# Patient Record
Sex: MALE | Race: WHITE | NOT HISPANIC OR LATINO | Employment: OTHER | URBAN - METROPOLITAN AREA
[De-identification: names, ages, dates, MRNs, and addresses within clinical notes are randomized per-mention and may not be internally consistent; named-entity substitution may affect disease eponyms.]

---

## 2017-02-18 RX ORDER — ATORVASTATIN CALCIUM 20 MG/1
TABLET, FILM COATED ORAL
Qty: 90 TABLET | Refills: 1 | Status: SHIPPED | OUTPATIENT
Start: 2017-02-18 | End: 2017-08-20 | Stop reason: SDUPTHER

## 2017-08-21 RX ORDER — ATORVASTATIN CALCIUM 20 MG/1
20 TABLET, FILM COATED ORAL NIGHTLY
Qty: 90 TABLET | Refills: 0 | Status: SHIPPED | OUTPATIENT
Start: 2017-08-21 | End: 2017-11-20 | Stop reason: SDUPTHER

## 2017-11-01 ENCOUNTER — OFFICE VISIT (OUTPATIENT)
Dept: CARDIOLOGY | Facility: CLINIC | Age: 72
End: 2017-11-01

## 2017-11-01 VITALS
HEIGHT: 70 IN | SYSTOLIC BLOOD PRESSURE: 142 MMHG | BODY MASS INDEX: 29.78 KG/M2 | HEART RATE: 77 BPM | WEIGHT: 208 LBS | DIASTOLIC BLOOD PRESSURE: 82 MMHG

## 2017-11-01 DIAGNOSIS — E78.2 MIXED HYPERLIPIDEMIA: ICD-10-CM

## 2017-11-01 DIAGNOSIS — I25.10 CORONARY ARTERY DISEASE INVOLVING NATIVE CORONARY ARTERY OF NATIVE HEART WITHOUT ANGINA PECTORIS: Primary | ICD-10-CM

## 2017-11-01 PROCEDURE — 99214 OFFICE O/P EST MOD 30 MIN: CPT | Performed by: INTERNAL MEDICINE

## 2017-11-01 PROCEDURE — 93000 ELECTROCARDIOGRAM COMPLETE: CPT | Performed by: INTERNAL MEDICINE

## 2017-11-01 NOTE — PROGRESS NOTES
Date of Office Visit: 2017  Encounter Provider: Adin Olivas MD  Place of Service: [facility] CARDIOLOGY  Patient Name: Ramon Reid  :1945      Chief Complaint   Patient presents with   • Coronary Artery Disease     History of Present Illness    Patient is a 72-year-old white male with coronary artery disease status post CABG many years ago.  He remains again completely asymptomatic.  He denies any complaints of chest pain shortness of breath, lightheadedness dizziness nor orthopnea.  From my previous notes in him and continuing today he also complains of erectile dysfunction.  He now is complaining of symptoms that sound typical of BPH.    Past Medical History:   Diagnosis Date   • Hyperlipidemia    • Ischemic cardiomyopathy    • Myocardial ischemia          Past Surgical History:   Procedure Laterality Date   • CORONARY ARTERY BYPASS GRAFT             Current Outpatient Prescriptions:   •  aspirin 81 MG tablet, Take  by mouth daily., Disp: , Rfl:   •  atorvastatin (LIPITOR) 20 MG tablet, Take 1 tablet by mouth Every Night. NEEDS CHOLESTEROL LABS, Disp: 90 tablet, Rfl: 0  •  nitroglycerin (NITROSTAT) 0.4 MG SL tablet, Place 1 tablet under the tongue every 5 (five) minutes as needed for chest pain., Disp: 25 tablet, Rfl: 3      Social History     Social History   • Marital status:      Spouse name: N/A   • Number of children: N/A   • Years of education: N/A     Occupational History   • Not on file.     Social History Main Topics   • Smoking status: Former Smoker   • Smokeless tobacco: Not on file   • Alcohol use Not on file   • Drug use: Not on file   • Sexual activity: Not on file     Other Topics Concern   • Not on file     Social History Narrative         Review of Systems   Constitution: Negative.   HENT: Negative.    Eyes: Negative.    Cardiovascular: Negative.    Respiratory: Negative.    Endocrine: Negative.    Skin: Negative.   "  Musculoskeletal: Negative.    Gastrointestinal: Negative.    Genitourinary: Positive for decreased libido, frequency and hesitancy.   Neurological: Negative.    Psychiatric/Behavioral: Negative.        Procedures      ECG 12 Lead  Date/Time: 11/1/2017 1:04 PM  Performed by: SIMON SEARS  Authorized by: SIMON SEARS   Comparison: compared with previous ECG from 8/4/2016  Similar to previous ECG  Rhythm: sinus rhythm  Rate: normal  Conduction: conduction normal  QRS axis: normal                 Objective:    /82  Pulse 77  Ht 70\" (177.8 cm)  Wt 208 lb (94.3 kg)  BMI 29.84 kg/m2        Physical Exam   Constitutional: He is oriented to person, place, and time. He appears well-developed and well-nourished.   HENT:   Head: Normocephalic.   Eyes: Pupils are equal, round, and reactive to light.   Neck: Normal range of motion. No JVD present. Carotid bruit is not present. No thyromegaly present.   Cardiovascular: Normal rate, regular rhythm, S1 normal, S2 normal, normal heart sounds and intact distal pulses.  Exam reveals no gallop and no friction rub.    No murmur heard.  Pulmonary/Chest: Effort normal and breath sounds normal.   Abdominal: Soft. Bowel sounds are normal.   Musculoskeletal: He exhibits no edema.   Neurological: He is alert and oriented to person, place, and time.   Skin: Skin is warm, dry and intact. No erythema.   Psychiatric: He has a normal mood and affect.   Vitals reviewed.          Assessment:       Diagnosis Plan   1. Coronary artery disease involving native coronary artery of native heart without angina pectoris     2. Mixed hyperlipidemia         Coronary Artery Disease  Assessment  • The patient has no angina    Plan  • Lifestyle modifications discussed include adhering to a heart healthy diet, regular exercise and regular monitoring of cholesterol and blood pressure    Subjective - Objective  • There is a history of previous coronary artery bypass graft  • Current " antiplatelet therapy includes aspirin 81 mg       Hyperlipidemia: I reviewed his laboratory work all appears to be good.  He remains on medical therapy  Benign prostatic hypertrophy, suspected: I referred him again to Dr. Fredis Orr.

## 2017-11-21 RX ORDER — ATORVASTATIN CALCIUM 20 MG/1
20 TABLET, FILM COATED ORAL NIGHTLY
Qty: 90 TABLET | Refills: 3 | Status: SHIPPED | OUTPATIENT
Start: 2017-11-21 | End: 2018-11-01 | Stop reason: SDUPTHER

## 2018-09-24 PROBLEM — I25.5 CARDIOMYOPATHY, ISCHEMIC: Status: ACTIVE | Noted: 2018-09-24

## 2018-09-24 PROBLEM — E78.5 HLD (HYPERLIPIDEMIA): Status: ACTIVE | Noted: 2018-09-24

## 2018-09-24 NOTE — PROGRESS NOTES
Date of Office Visit: 2018  Encounter Provider: PEEWEE Mckenzie  Place of Service: [facility] CARDIOLOGY  Patient Name: Ramon Reid  :1945      Subjective:     Chief Complaint: CAD      History of Present Illness:  Ramon Reid is a pleasant 73 y.o. male who is new to me.  Outside records have been obtained and reviewed by me.  The patient has a past medical history of coronary artery disease status post coronary artery bypass grafting ×6 in , ischemic cardiomyopathy, hyperlipidemia.    The patient presented to [facility] on 18 after having a syncopal episode.  The patient was sitting outside playing a game with his wife when he felt very hot and had sudden episode of syncope.  His wife yelled his name and he gradually came back to his normal mental status.  His total loss of consciousness lasted for approximately 2-3 seconds.  After the patient came to he originally did not believe his wife that he had fainted.  There was no reported seizure activity noted.  Aside from dizziness, the patient reported no symptoms before or after the episode.  He denied any chest pain, shortness of breath, diaphoresis, palpitations, nausea, vomiting, diarrhea, fever, chills, cough.  He does report constant numbness and tingling of the left hand which is chronic and he was told in the past that it was likely the related to how he is slept in the past and has been previously worked up.    His EKG revealed normal sinus rhythm, normal axis, no acute ischemic changes, QTC of 429 ms.His chest x-ray PA and lateral  on 18 showed lungs are clear, no pneumothorax or pleural effusion and no acute cardiopulmonary findings.  His heart and mediastinal contours were normal and there were median sternotomy wires present.  His CT of the head on 18 showed no acute intracranial abnormality, mild to moderate age-related atrophy and chronic small vessel disease,  mucosal thickening in bilateral ethmoid air cells and bilateral maxillary sinuses.  His labs were essentially unremarkable aside from hypomagnesemia.  Cardiac enzymes ×3 were negative.  D-dimer was negative at 0.45.  Urine negative.  Toxicology was negative.  Carotid ultrasounds showed no significant stenosis but mild plaque noted in both carotid systems.  His vital signs were stable blood pressures ranging 130s to 140s over 70s and 80s.  Heart rate ranging 60s to 70s.  He was afebrile and pulse ox was % on room air.  Orthostatics were also ordered.    There were no arrhythmias on rhythm strips from the heart monitor.  Echocardiogram was obtained and pending at the time of his discharge.  [facility] cardiology Dr. Shahbaz Mayen with consulted to evaluate the patient and the patient was supposed to undergo cardiac catheterization because he had not had an ischemic workup (although previously recommended multiple times) since his bypass surgery in 2000.  However there was miscommunication and the wife brought the patient breakfast although it was supposed to have nothing by mouth.  It was felt that since the patient no longer had symptoms and was unable to have the cardiac catheterization because he ate that he was stable for discharge and he could follow up with his primary cardiologist.  He was discharged home on supplemental dose of magnesium, as well as on aspirin 81 mg, atorvastatin 10 mg and magnesium oxide 400 mg daily.  I received the echocardiogram results from [facility] from 9/26/18.  It was reported that it was a technically difficult study with limited windows, visualized valves appear structurally normal with mild mitral and tricuspid insufficiency, borderline left atrial enlargement, the left ventricle size was upper limits of normal with mild global hypokinesis and an estimated ejection fraction of 45-50%, there was no pericardial effusion seen.  Calculated ejection fraction was  "47%.  It appears his last echocardiogram was on 11/30/1999 in which revealed normal chamber sizes, normal LV systolic and diastolic function, mild mitral valve thickening and trace tricuspid insufficiency with an estimated RVSP of 26 mmHg which is normal.  Do not see a reported ejection fraction on this report.  It states the LV stroke volume is 44 cc and the calculated effective cardiac output is 3.83 L/m.  His last cardiac catheterization on 1/7/2000 prior to his coronary artery bypass grafting demonstrated two-vessel coronary artery disease (left circumflex and RCA )as well as a mildly reduced left ventricular function with an EF of 50% with inferior hypokinesia.    The patient has refused multiple Cardiolite stress tests in the past for several years now after having a \"bad experience\" with his last Cardiolite stress test, which and the documentation sounds like it may have been tachycardia.The patient was last in the office in 11/1/17 by Dr. Olivas.    He is here today for hospital follow-up. He has had no recurrence of symptoms.  His wife stated that they may have exaggerated some symptoms while he was at [facility] because they wanted to get some things checked out before he retires.  He really felt fine immediately after his syncopal episode and only had a little bit of dizziness prior to the syncope. It was hot outside that day and he was sweating while sitting on the porch.  He is now on a magnesium supplement for his hypomagnesemia.  He has a follow-up with his PCP Dr. Oneill on Monday to follow up on lab work.  He is requesting a refill on sublingual nitroglycerin, though he has never used it or needed in the past he was told by Dr. Olivas in the past that it was good to always keep on hand with his heart disease.  He denies any chest pain, shortness breath, palpitations, swelling of the legs, dizziness, syncope, near syncope, falls, fatigue, PND, orthopnea or significant weight gain.  He " reports that he can cut grass and repeat 5 acres without experiencing any angina.  Daunting he noticed since his CABG in 2000 was that he gets episodes of sweating and hot flashes these have been occurring intermittently since his surgery but he said it may be has been happening a little more frequently in the last year.  There is no other associated symptoms with these hot flashes and sweating.  He reports he checks his blood pressure at home couple times a week and is running 129-133/70's.  It is a little bit elevated in office today at 158/76.  He reports his heart rate runs in the 60s to 70s.  He recalls being on some type of blood pressure medicine several years ago in the past after his heart surgery and thinks he was taken off of it for having an episode of hypotension with dizziness and sensation of fainting.      Past Medical History:   Diagnosis Date   • CAD (coronary artery disease)    • Episode of syncope 09/20/2018    [place], [place]   • Hyperlipidemia    • Hypomagnesemia 09/20/2018   • Ischemic cardiomyopathy    • Myocardial ischemia      Past Surgical History:   Procedure Laterality Date   • CORONARY ARTERY BYPASS GRAFT  2000     Outpatient Medications Prior to Visit   Medication Sig Dispense Refill   • aspirin 81 MG tablet Take  by mouth daily.     • atorvastatin (LIPITOR) 20 MG tablet Take 1 tablet by mouth Every Night. 90 tablet 3   • nitroglycerin (NITROSTAT) 0.4 MG SL tablet Place 1 tablet under the tongue every 5 (five) minutes as needed for chest pain. 25 tablet 3     No facility-administered medications prior to visit.        Allergies as of 09/28/2018 - Reviewed 09/28/2018   Allergen Reaction Noted   • Thiopental  08/02/2016     Social History     Social History   • Marital status:      Spouse name: N/A   • Number of children: N/A   • Years of education: N/A     Occupational History   • Not on file.     Social History Main Topics   • Smoking status: Former  "Smoker   • Smokeless tobacco: Not on file   • Alcohol use No   • Drug use: No   • Sexual activity: Defer     Other Topics Concern   • Not on file     Social History Narrative   • No narrative on file     History reviewed. No pertinent family history.  Review of Systems   Constitution: Negative for chills, fever and malaise/fatigue.   HENT: Positive for hearing loss (some per patient). Negative for ear pain, nosebleeds and sore throat.    Eyes: Negative for double vision, pain, vision loss in left eye and vision loss in right eye.   Cardiovascular: Positive for syncope (one episode- has not occurred since discharge from hospital). Negative for chest pain, claudication, dyspnea on exertion, irregular heartbeat, leg swelling, near-syncope, orthopnea, palpitations and paroxysmal nocturnal dyspnea.   Respiratory: Positive for snoring. Negative for cough, shortness of breath and wheezing.    Endocrine: Positive for cold intolerance and heat intolerance.   Hematologic/Lymphatic: Negative for bleeding problem.   Skin: Negative for color change, itching, rash and unusual hair distribution.   Musculoskeletal: Positive for joint pain. Negative for joint swelling.   Gastrointestinal: Negative for abdominal pain, diarrhea, hematochezia, melena, nausea and vomiting.   Genitourinary: Negative for decreased libido, frequency, hematuria, hesitancy and incomplete emptying.        Slow urinary stream   Neurological: Positive for paresthesias (left arm). Negative for excessive daytime sleepiness, dizziness, headaches, light-headedness, loss of balance, numbness and seizures.   Psychiatric/Behavioral: Negative for depression.          Objective:     Vitals:    09/28/18 1355   BP: 158/76   BP Location: Left arm   Patient Position: Sitting   Pulse: 73   SpO2: 97%   Weight: 92.5 kg (204 lb)   Height: 177.8 cm (70\")     Body mass index is 29.27 kg/m².    PHYSICAL EXAM:  Physical Exam   Constitutional: He is oriented to person, place, and " time. He appears well-developed and well-nourished. No distress.   HENT:   Head: Normocephalic and atraumatic.   Eyes: Pupils are equal, round, and reactive to light.   Neck: Neck supple. No JVD present. Carotid bruit is not present.   Cardiovascular: Normal rate, regular rhythm, normal heart sounds and intact distal pulses.    No murmur heard.  Pulses:       Radial pulses are 2+ on the right side, and 2+ on the left side.        Posterior tibial pulses are 2+ on the right side, and 2+ on the left side.   Pulmonary/Chest: Effort normal and breath sounds normal. No accessory muscle usage. No respiratory distress. He has no decreased breath sounds. He has no wheezes. He has no rhonchi. He has no rales.   Abdominal: Soft. Bowel sounds are normal. He exhibits no distension. There is no splenomegaly or hepatomegaly. There is no tenderness.   Overweight, abdomen, rounded but compressible   Musculoskeletal: Normal range of motion. He exhibits no edema.   Neurological: He is alert and oriented to person, place, and time.   Skin: Skin is warm, dry and intact. He is not diaphoretic. No erythema.   Psychiatric: He has a normal mood and affect. His speech is normal and behavior is normal. Judgment and thought content normal. Cognition and memory are normal.   Nursing note and vitals reviewed.        ECG 12 Lead  Date/Time: 9/28/2018 2:16 PM  Performed by: IRENA SINHA  Authorized by: IRENA SINHA   Comparison: compared with previous ECG   Similar to previous ECG  Rhythm: sinus rhythm  Ectopy: multifocal PVCs  BPM: 69  Comments: SR with multifocal PVCs  Otherwise normal ECG  Indication: CAD            Assessment:       Diagnosis Plan   1. Coronary artery disease involving native coronary artery of native heart without angina pectoris  Adult Stress Echo W/ Cont or Stress Agent if Necessary Per Protocol   2. S/P CABG (coronary artery bypass graft)  Adult Stress Echo W/ Cont or Stress Agent if Necessary Per Protocol   3.  Cardiomyopathy, ischemic  Adult Stress Echo W/ Cont or Stress Agent if Necessary Per Protocol   4. Hyperlipidemia, unspecified hyperlipidemia type  Adult Stress Echo W/ Cont or Stress Agent if Necessary Per Protocol       Plan:     1.  Coronary Artery Disease  Assessment  • The patient has no angina  • S/p CABG in 2000    Subjective - Objective  • There is a history of previous coronary artery bypass graft  • Current antiplatelet therapy includes aspirin 81 mg      This patient has not had any ischemic workup or stress test since his CABG in 2000 because he refused.  He apparently had a bad reaction and tachycardia with a Cardiolite stress test in the past.  I will order a stress echo.  I sent him a prescription for sublingual nitroglycerin and I explained to him how to use it and what to do an emergency.  Both he and his wife demonstrated understanding.    2. Heart Failure  Assessment  • NYHA class I - There is no limitation of physical activity. Physical activity does not cause fatigue, palpitations or shortness of breath.  • The most recent ejection fraction is 47%  • Left ventricular function is mildly reduced by qualitative assessment  • The left ventricle was last assessed on 9/26/2018  • The patient's echo was done at an outlying facility I am repeating with a stress echo.    Subjective/Objective    • Physical exam findings negative for rales, peripheral edema, elevated JVP and hepatomegaly.        Cardiomyopathy:  Echo results from [facility] from 9/26/18 reported that it was a technically difficult study with limited windows, visualized valves appear structurally normal with mild mitral and tricuspid insufficiency, borderline left atrial enlargement, the left ventricle size was upper limits of normal with mild global hypokinesis and an estimated ejection fraction of 45-50%, there was no pericardial effusion seen.  Calculated ejection fraction was 47%.  The only echo I had to compare to  was  from 1999.  His LV gram on his cardiac cath in 2000 did EF of 50%.  We discussed adding a beta blocker but I will hold off at this time and see what the stress echo shows.  He is showing no signs of heart failure at this time.    3. Hyperlipidemia: On Lipitor 20 mg daily. Apparently he just had some fasting labs that were faxed to our office but I have not received these yet.  He normally has Dr. Olivas check his fasting lipid panel and liver function tests.  I will check for these labs and he will call back next week to confirm we have the lab results we need. If we do not I gave him an order just in case to go have his fasting lipid panel and CMP checked for Dr. Olivas to review at his next visit to make titrations to his Lipitor if needed.      Follow up with Dr. Olivas  on 11/1/18, unless otherwise needed sooner.  I advised the patient to contact our office with any questions or concerns.         Your medication list          Accurate as of 9/28/18  4:52 PM. If you have any questions, ask your nurse or doctor.               CHANGE how you take these medications      Instructions Last Dose Given Next Dose Due   nitroglycerin 0.4 MG SL tablet  Commonly known as:  NITROSTAT  What changed:  additional instructions  Changed by:  PEEWEE Mckenzie      Place 1 tablet under the tongue Every 5 (Five) Minutes As Needed for Chest Pain. If no relief in pain after 2nd dose call 911.          CONTINUE taking these medications      Instructions Last Dose Given Next Dose Due   aspirin 81 MG tablet      Take  by mouth daily.       atorvastatin 20 MG tablet  Commonly known as:  LIPITOR      Take 1 tablet by mouth Every Night.       magnesium oxide 400 (241.3 Mg) MG tablet tablet  Commonly known as:  MAGOX      Take 400 mg by mouth Daily.             Where to Get Your Medications      These medications were sent to [facility] [address] [place] [address] [phone] [facility] [phone] FX   [address] [address]    Phone:  [phone]   · nitroglycerin 0.4 MG SL tablet         The above medication changes may not have been made by this provider.  Medication list was updated to reflect medications patient is currently taking including medication changes and discontinuations made by other healthcare providers.       I have reviewed this case with Nydia Wadsworth PA-C and I will review this case with Dr. Olivas. It has been a pleasure to participate in this patient's care. Please feel free to contact me with any questions or concerns.     Stacey Hull, APRN  09/28/2018       Dictated utilizing Dragon Dictation System.

## 2018-09-28 ENCOUNTER — OFFICE VISIT (OUTPATIENT)
Dept: CARDIOLOGY | Facility: CLINIC | Age: 73
End: 2018-09-28

## 2018-09-28 VITALS
SYSTOLIC BLOOD PRESSURE: 158 MMHG | BODY MASS INDEX: 29.2 KG/M2 | DIASTOLIC BLOOD PRESSURE: 76 MMHG | OXYGEN SATURATION: 97 % | HEART RATE: 73 BPM | HEIGHT: 70 IN | WEIGHT: 204 LBS

## 2018-09-28 DIAGNOSIS — I25.5 CARDIOMYOPATHY, ISCHEMIC: ICD-10-CM

## 2018-09-28 DIAGNOSIS — I25.10 CORONARY ARTERY DISEASE INVOLVING NATIVE CORONARY ARTERY OF NATIVE HEART WITHOUT ANGINA PECTORIS: Primary | ICD-10-CM

## 2018-09-28 DIAGNOSIS — Z95.1 S/P CABG (CORONARY ARTERY BYPASS GRAFT): ICD-10-CM

## 2018-09-28 DIAGNOSIS — E78.5 HYPERLIPIDEMIA, UNSPECIFIED HYPERLIPIDEMIA TYPE: ICD-10-CM

## 2018-09-28 PROCEDURE — 99214 OFFICE O/P EST MOD 30 MIN: CPT | Performed by: NURSE PRACTITIONER

## 2018-09-28 PROCEDURE — 93000 ELECTROCARDIOGRAM COMPLETE: CPT | Performed by: NURSE PRACTITIONER

## 2018-09-28 RX ORDER — MAGNESIUM CARB/ALUMINUM HYDROX 105-160MG
150 TABLET,CHEWABLE ORAL ONCE
COMMUNITY
End: 2018-09-28

## 2018-09-28 RX ORDER — NITROGLYCERIN 0.4 MG/1
0.4 TABLET SUBLINGUAL
Qty: 45 TABLET | Refills: 11 | Status: SHIPPED | OUTPATIENT
Start: 2018-09-28 | End: 2020-10-07 | Stop reason: SDUPTHER

## 2018-10-10 ENCOUNTER — HOSPITAL ENCOUNTER (OUTPATIENT)
Dept: CARDIOLOGY | Facility: HOSPITAL | Age: 73
Discharge: HOME | End: 2018-10-10
Admitting: NURSE PRACTITIONER

## 2018-10-10 VITALS
WEIGHT: 206 LBS | BODY MASS INDEX: 29.49 KG/M2 | DIASTOLIC BLOOD PRESSURE: 78 MMHG | SYSTOLIC BLOOD PRESSURE: 128 MMHG | HEART RATE: 71 BPM | HEIGHT: 70 IN | OXYGEN SATURATION: 97 %

## 2018-10-10 DIAGNOSIS — Z95.1 S/P CABG (CORONARY ARTERY BYPASS GRAFT): ICD-10-CM

## 2018-10-10 DIAGNOSIS — I25.10 CORONARY ARTERY DISEASE INVOLVING NATIVE CORONARY ARTERY OF NATIVE HEART WITHOUT ANGINA PECTORIS: ICD-10-CM

## 2018-10-10 DIAGNOSIS — E78.5 HYPERLIPIDEMIA, UNSPECIFIED HYPERLIPIDEMIA TYPE: ICD-10-CM

## 2018-10-10 DIAGNOSIS — I25.5 CARDIOMYOPATHY, ISCHEMIC: ICD-10-CM

## 2018-10-10 LAB
BH CV ECHO MEAS - ACS: 2.2 CM
BH CV ECHO MEAS - AO MAX PG: 3.7 MMHG
BH CV ECHO MEAS - AO ROOT AREA (BSA CORRECTED): 1.7
BH CV ECHO MEAS - AO ROOT AREA: 10.2 CM^2
BH CV ECHO MEAS - AO ROOT DIAM: 3.6 CM
BH CV ECHO MEAS - AO V2 MAX: 96 CM/SEC
BH CV ECHO MEAS - BSA(HAYCOCK): 2.2 M^2
BH CV ECHO MEAS - BSA: 2.1 M^2
BH CV ECHO MEAS - BZI_BMI: 29.6 KILOGRAMS/M^2
BH CV ECHO MEAS - BZI_METRIC_HEIGHT: 177.8 CM
BH CV ECHO MEAS - BZI_METRIC_WEIGHT: 93.4 KG
BH CV ECHO MEAS - EDV(CUBED): 192.8 ML
BH CV ECHO MEAS - EDV(MOD-SP4): 85 ML
BH CV ECHO MEAS - EDV(TEICH): 165 ML
BH CV ECHO MEAS - EF(CUBED): 68 %
BH CV ECHO MEAS - EF(MOD-BP): 50 %
BH CV ECHO MEAS - EF(MOD-SP4): 49.4 %
BH CV ECHO MEAS - EF(TEICH): 58.8 %
BH CV ECHO MEAS - ESV(CUBED): 61.7 ML
BH CV ECHO MEAS - ESV(MOD-SP4): 43 ML
BH CV ECHO MEAS - ESV(TEICH): 68 ML
BH CV ECHO MEAS - FS: 31.6 %
BH CV ECHO MEAS - IVS/LVPW: 0.91
BH CV ECHO MEAS - IVSD: 0.87 CM
BH CV ECHO MEAS - LAT PEAK E' VEL: 8 CM/SEC
BH CV ECHO MEAS - LV DIASTOLIC VOL/BSA (35-75): 40.2 ML/M^2
BH CV ECHO MEAS - LV MASS(C)D: 205.6 GRAMS
BH CV ECHO MEAS - LV MASS(C)DI: 97.3 GRAMS/M^2
BH CV ECHO MEAS - LV SYSTOLIC VOL/BSA (12-30): 20.3 ML/M^2
BH CV ECHO MEAS - LVIDD: 5.8 CM
BH CV ECHO MEAS - LVIDS: 4 CM
BH CV ECHO MEAS - LVLD AP4: 7 CM
BH CV ECHO MEAS - LVLS AP4: 6.7 CM
BH CV ECHO MEAS - LVPWD: 0.96 CM
BH CV ECHO MEAS - MED PEAK E' VEL: 6 CM/SEC
BH CV ECHO MEAS - MV A DUR: 0.13 SEC
BH CV ECHO MEAS - MV A MAX VEL: 91.7 CM/SEC
BH CV ECHO MEAS - MV DEC SLOPE: 255.8 CM/SEC^2
BH CV ECHO MEAS - MV DEC TIME: 0.24 SEC
BH CV ECHO MEAS - MV E MAX VEL: 62.6 CM/SEC
BH CV ECHO MEAS - MV E/A: 0.68
BH CV ECHO MEAS - MV MAX PG: 3 MMHG
BH CV ECHO MEAS - MV MEAN PG: 1.2 MMHG
BH CV ECHO MEAS - MV P1/2T MAX VEL: 64 CM/SEC
BH CV ECHO MEAS - MV P1/2T: 73.3 MSEC
BH CV ECHO MEAS - MV V2 MAX: 86.3 CM/SEC
BH CV ECHO MEAS - MV V2 MEAN: 51.7 CM/SEC
BH CV ECHO MEAS - MV V2 VTI: 23.5 CM
BH CV ECHO MEAS - MVA P1/2T LCG: 3.4 CM^2
BH CV ECHO MEAS - MVA(P1/2T): 3 CM^2
BH CV ECHO MEAS - PULM A REVS DUR: 0.12 SEC
BH CV ECHO MEAS - PULM A REVS VEL: 32.8 CM/SEC
BH CV ECHO MEAS - PULM DIAS VEL: 46.1 CM/SEC
BH CV ECHO MEAS - PULM S/D: 1.3
BH CV ECHO MEAS - PULM SYS VEL: 61.2 CM/SEC
BH CV ECHO MEAS - RAP SYSTOLE: 3 MMHG
BH CV ECHO MEAS - RVSP: 14 MMHG
BH CV ECHO MEAS - SI(CUBED): 62 ML/M^2
BH CV ECHO MEAS - SI(MOD-SP4): 19.9 ML/M^2
BH CV ECHO MEAS - SI(TEICH): 45.9 ML/M^2
BH CV ECHO MEAS - SUP REN AO DIAM: 1.8 CM
BH CV ECHO MEAS - SV(CUBED): 131 ML
BH CV ECHO MEAS - SV(MOD-SP4): 42 ML
BH CV ECHO MEAS - SV(TEICH): 97 ML
BH CV ECHO MEAS - TR MAX VEL: 153.4 CM/SEC
BH CV ECHO MEASUREMENTS AVERAGE E/E' RATIO: 8.94
BH CV STRESS BP STAGE 1: NORMAL
BH CV STRESS BP STAGE 2: NORMAL
BH CV STRESS DURATION MIN STAGE 1: 3
BH CV STRESS DURATION MIN STAGE 2: 3
BH CV STRESS DURATION SEC STAGE 1: 0
BH CV STRESS DURATION SEC STAGE 2: 0
BH CV STRESS ECHO POST STRESS EJECTION FRACTION EF: 63 %
BH CV STRESS GRADE STAGE 1: 10
BH CV STRESS GRADE STAGE 2: 12
BH CV STRESS HR STAGE 1: 120
BH CV STRESS HR STAGE 2: 148
BH CV STRESS METS STAGE 1: 5
BH CV STRESS METS STAGE 2: 7.5
BH CV STRESS PROTOCOL 1: NORMAL
BH CV STRESS SPEED STAGE 1: 1.7
BH CV STRESS SPEED STAGE 2: 2.5
BH CV STRESS STAGE 1: 1
BH CV STRESS STAGE 2: 2
LEFT ATRIUM VOLUME INDEX: 23 ML/M2
MAXIMAL PREDICTED HEART RATE: 146 BPM
PERCENT MAX PREDICTED HR: 101.37 %
STRESS BASELINE BP: NORMAL MMHG
STRESS BASELINE HR: 71 BPM
STRESS PERCENT HR: 119 %
STRESS POST ESTIMATED WORKLOAD: 7 METS
STRESS POST EXERCISE DUR MIN: 6 MIN
STRESS POST EXERCISE DUR SEC: 0 SEC
STRESS POST PEAK BP: NORMAL MMHG
STRESS POST PEAK HR: 148 BPM
STRESS TARGET HR: 124 BPM

## 2018-10-10 PROCEDURE — 93325 DOPPLER ECHO COLOR FLOW MAPG: CPT | Performed by: INTERNAL MEDICINE

## 2018-10-10 PROCEDURE — 93320 DOPPLER ECHO COMPLETE: CPT | Performed by: INTERNAL MEDICINE

## 2018-10-10 PROCEDURE — 93016 CV STRESS TEST SUPVJ ONLY: CPT | Performed by: INTERNAL MEDICINE

## 2018-10-10 PROCEDURE — 93325 DOPPLER ECHO COLOR FLOW MAPG: CPT

## 2018-10-10 PROCEDURE — 93320 DOPPLER ECHO COMPLETE: CPT

## 2018-10-10 PROCEDURE — 93352 ADMIN ECG CONTRAST AGENT: CPT | Performed by: INTERNAL MEDICINE

## 2018-10-10 PROCEDURE — 93350 STRESS TTE ONLY: CPT

## 2018-10-10 PROCEDURE — 93350 STRESS TTE ONLY: CPT | Performed by: INTERNAL MEDICINE

## 2018-10-10 PROCEDURE — 25010000002 PERFLUTREN (DEFINITY) 8.476 MG IN SODIUM CHLORIDE 0.9 % 10 ML INJECTION: Performed by: INTERNAL MEDICINE

## 2018-10-10 PROCEDURE — 93017 CV STRESS TEST TRACING ONLY: CPT

## 2018-10-10 PROCEDURE — 93018 CV STRESS TEST I&R ONLY: CPT | Performed by: INTERNAL MEDICINE

## 2018-10-10 RX ADMIN — PERFLUTREN 1.5 ML: 6.52 INJECTION, SUSPENSION INTRAVENOUS at 10:13

## 2018-11-01 ENCOUNTER — OFFICE VISIT (OUTPATIENT)
Dept: CARDIOLOGY | Facility: CLINIC | Age: 73
End: 2018-11-01

## 2018-11-01 VITALS
WEIGHT: 206 LBS | DIASTOLIC BLOOD PRESSURE: 78 MMHG | BODY MASS INDEX: 29.49 KG/M2 | HEART RATE: 71 BPM | SYSTOLIC BLOOD PRESSURE: 118 MMHG | HEIGHT: 70 IN

## 2018-11-01 DIAGNOSIS — E83.42 HYPOMAGNESEMIA: ICD-10-CM

## 2018-11-01 DIAGNOSIS — E78.5 HYPERLIPIDEMIA, UNSPECIFIED HYPERLIPIDEMIA TYPE: ICD-10-CM

## 2018-11-01 DIAGNOSIS — I25.10 CORONARY ARTERY DISEASE INVOLVING NATIVE CORONARY ARTERY OF NATIVE HEART WITHOUT ANGINA PECTORIS: Primary | ICD-10-CM

## 2018-11-01 PROCEDURE — 99214 OFFICE O/P EST MOD 30 MIN: CPT | Performed by: INTERNAL MEDICINE

## 2018-11-01 PROCEDURE — 93000 ELECTROCARDIOGRAM COMPLETE: CPT | Performed by: INTERNAL MEDICINE

## 2018-11-01 RX ORDER — ATORVASTATIN CALCIUM 20 MG/1
20 TABLET, FILM COATED ORAL NIGHTLY
Qty: 90 TABLET | Refills: 3 | Status: SHIPPED | OUTPATIENT
Start: 2018-11-01 | End: 2019-12-11 | Stop reason: SDUPTHER

## 2018-11-01 NOTE — PROGRESS NOTES
Date of Office Visit: 2018  Encounter Provider: Adin Olivas MD  Place of Service: [facility] CARDIOLOGY  Patient Name: Ramon Reid  :1945      Chief Complaint   Patient presents with   • Coronary Artery Disease     History of Present Illness    The patient is a 73-year-old white male with a history of coronary artery disease.  He is status post previous bypass surgery a number of years ago.  He was evaluated one month ago here in the office after experiencing a syncopal episode that lasted for just a few seconds.  Lies at [facility] at the end result was hypomagnesemia.  Since that has been replaced the tingling he used to have in his left arm is gone.  Not had any recurrent episodes of syncope.  He is monitored his heart rate and blood pressure over the last month and things have improved greatly.  He had a stress echocardiogram that did not indicate any evidence of significant ischemia or arrhythmic events.    Past Medical History:   Diagnosis Date   • CAD (coronary artery disease)    • Episode of syncope 2018    [place], [place]   • Hyperlipidemia    • Hypomagnesemia 2018   • Ischemic cardiomyopathy    • Myocardial ischemia          Past Surgical History:   Procedure Laterality Date   • CORONARY ARTERY BYPASS GRAFT             Current Outpatient Prescriptions:   •  aspirin 81 MG tablet, Take  by mouth daily., Disp: , Rfl:   •  atorvastatin (LIPITOR) 20 MG tablet, Take 1 tablet by mouth Every Night., Disp: 90 tablet, Rfl: 3  •  magnesium oxide (MAGOX) 400 (241.3 Mg) MG tablet tablet, Take 400 mg by mouth Daily., Disp: , Rfl:   •  nitroglycerin (NITROSTAT) 0.4 MG SL tablet, Place 1 tablet under the tongue Every 5 (Five) Minutes As Needed for Chest Pain. If no relief in pain after 2nd dose call 911., Disp: 45 tablet, Rfl: 11      Social History     Social History   • Marital status:      Spouse  "name: N/A   • Number of children: N/A   • Years of education: N/A     Occupational History   • Not on file.     Social History Main Topics   • Smoking status: Former Smoker   • Smokeless tobacco: Not on file   • Alcohol use No   • Drug use: No   • Sexual activity: Defer     Other Topics Concern   • Not on file     Social History Narrative   • No narrative on file         Review of Systems   Constitution: Negative.   HENT: Negative.    Eyes: Negative.    Cardiovascular: Negative.    Respiratory: Negative.    Endocrine: Negative.    Skin: Negative.    Musculoskeletal: Negative.    Gastrointestinal: Negative.    Neurological: Negative.    Psychiatric/Behavioral: Negative.        Procedures      ECG 12 Lead  Date/Time: 11/1/2018 1:01 PM  Performed by: SIMON SEARS  Authorized by: SIMON SEARS   Comparison: compared with previous ECG from 9/28/2018  Similar to previous ECG  Rhythm: sinus rhythm  Ectopy: unifocal PVCs  Rate: normal  Conduction: conduction normal  QRS axis: normal                  Objective:    /78   Pulse 71   Ht 177.8 cm (70\")   Wt 93.4 kg (206 lb)   BMI 29.56 kg/m²         Physical Exam   Constitutional: He is oriented to person, place, and time. He appears well-developed and well-nourished.   HENT:   Head: Normocephalic.   Eyes: Pupils are equal, round, and reactive to light.   Neck: Normal range of motion. No JVD present. Carotid bruit is not present. No thyromegaly present.   Cardiovascular: Normal rate, regular rhythm, S1 normal, S2 normal, normal heart sounds and intact distal pulses.  Exam reveals no gallop and no friction rub.    No murmur heard.  Pulmonary/Chest: Effort normal and breath sounds normal.   Abdominal: Soft. Bowel sounds are normal.   Musculoskeletal: He exhibits no edema.   Neurological: He is alert and oriented to person, place, and time.   Skin: Skin is warm, dry and intact. No erythema.   Psychiatric: He has a normal mood and affect.   Vitals " reviewed.          Assessment:       Diagnosis Plan   1. Coronary artery disease involving native coronary artery of native heart without angina pectoris     2. Hypomagnesemia     3. Hyperlipidemia, unspecified hyperlipidemia type         1. Coronary Artery Disease  Assessment  • The patient has no angina    Plan  • Lifestyle modifications discussed include adhering to a heart healthy diet, maintenance of a healthy weight, regular exercise and regular monitoring of cholesterol and blood pressure    Subjective - Objective  • There is a history of previous coronary artery bypass graft  • Current antiplatelet therapy includes aspirin 81 mg       2.  Hypomagnesemia: On supplemental magnesium  3.  Hyperlipidemia: On medical therapy  4.  Syncope: Unexplained, possible hypomagnesemia   Plan:     No change in medication at this time.  Patient will follow-up in one year

## 2019-10-02 ENCOUNTER — OFFICE VISIT (OUTPATIENT)
Dept: CARDIOLOGY | Facility: CLINIC | Age: 74
End: 2019-10-02

## 2019-10-02 VITALS
OXYGEN SATURATION: 98 % | DIASTOLIC BLOOD PRESSURE: 82 MMHG | BODY MASS INDEX: 28.6 KG/M2 | WEIGHT: 199.8 LBS | SYSTOLIC BLOOD PRESSURE: 126 MMHG | HEART RATE: 71 BPM | HEIGHT: 70 IN

## 2019-10-02 DIAGNOSIS — E78.5 HYPERLIPIDEMIA, UNSPECIFIED HYPERLIPIDEMIA TYPE: ICD-10-CM

## 2019-10-02 DIAGNOSIS — I25.10 CORONARY ARTERY DISEASE INVOLVING NATIVE CORONARY ARTERY OF NATIVE HEART WITHOUT ANGINA PECTORIS: Primary | ICD-10-CM

## 2019-10-02 PROCEDURE — 93000 ELECTROCARDIOGRAM COMPLETE: CPT | Performed by: INTERNAL MEDICINE

## 2019-10-02 PROCEDURE — 99214 OFFICE O/P EST MOD 30 MIN: CPT | Performed by: INTERNAL MEDICINE

## 2019-10-02 NOTE — PROGRESS NOTES
Date of Office Visit: 10/02/2019    Patient Name: Ramon Reid  : 1945    Encounter Provider: Adin Olivas MD  Referring Provider: No ref. provider found  Place of Service: [facility] CARDIOLOGY  Patient Care Team:  Alayna Oneill MD as PCP - General (Family Medicine)  Adin Olivas MD as PCP - Claims Attributed      Chief Complaint   Patient presents with   • Coronary Artery Disease     1 yr follow up     History of Present Illness  Patient is a 74-year-old white male with a history of coronary artery disease who presents to the office today for follow-up.  He is undergone bypass surgery in .  He denies any lightheadedness, dizziness, orthopnea, paroxysmal nocturnal dyspnea nor angina pectoris.  Past Medical History:   Diagnosis Date   • CAD (coronary artery disease)    • Episode of syncope 2018    [place], [place]   • Hyperlipidemia    • Hypomagnesemia 2018   • Ischemic cardiomyopathy    • Myocardial ischemia          Past Surgical History:   Procedure Laterality Date   • CORONARY ARTERY BYPASS GRAFT             Current Outpatient Medications:   •  aspirin 81 MG tablet, Take  by mouth daily., Disp: , Rfl:   •  atorvastatin (LIPITOR) 20 MG tablet, Take 1 tablet by mouth Every Night., Disp: 90 tablet, Rfl: 3  •  magnesium oxide (MAGOX) 400 (241.3 Mg) MG tablet tablet, Take 400 mg by mouth Daily., Disp: , Rfl:   •  nitroglycerin (NITROSTAT) 0.4 MG SL tablet, Place 1 tablet under the tongue Every 5 (Five) Minutes As Needed for Chest Pain. If no relief in pain after 2nd dose call 911., Disp: 45 tablet, Rfl: 11      Social History     Socioeconomic History   • Marital status:      Spouse name: Not on file   • Number of children: Not on file   • Years of education: Not on file   • Highest education level: Not on file   Tobacco Use   • Smoking status: Former Smoker   • Tobacco comment: caffeine  "use   Substance and Sexual Activity   • Alcohol use: No   • Drug use: No   • Sexual activity: Defer         Review of Systems   Constitution: Negative.   HENT: Negative.    Eyes: Negative.    Cardiovascular: Negative.    Respiratory: Negative.    Endocrine: Negative.    Skin: Negative.    Musculoskeletal: Negative.    Gastrointestinal: Negative.    Neurological: Negative.    Psychiatric/Behavioral: Negative.        Procedures      ECG 12 Lead  Date/Time: 10/2/2019 2:26 PM  Performed by: Adin Olivas MD  Authorized by: Adin Olivas MD   Comparison: compared with previous ECG from 11/1/2018  Similar to previous ECG  Rhythm: sinus rhythm  Rate: normal  Conduction: conduction normal  ST Segments: ST segments normal  T Waves: T waves normal  QRS axis: normal                  Objective:    /82 (BP Location: Left arm, Patient Position: Sitting, Cuff Size: Adult)   Pulse 71   Ht 177.8 cm (70\")   Wt 90.6 kg (199 lb 12.8 oz)   SpO2 98%   BMI 28.67 kg/m²         Physical Exam   Constitutional: He is oriented to person, place, and time. He appears well-developed and well-nourished.   HENT:   Head: Normocephalic.   Eyes: Pupils are equal, round, and reactive to light.   Neck: Normal range of motion. No JVD present. Carotid bruit is not present. No thyromegaly present.   Cardiovascular: Normal rate, regular rhythm, S1 normal, S2 normal, normal heart sounds and intact distal pulses. Exam reveals no gallop and no friction rub.   No murmur heard.  Pulmonary/Chest: Effort normal and breath sounds normal.   Abdominal: Soft. Bowel sounds are normal.   Musculoskeletal: He exhibits no edema.   Neurological: He is alert and oriented to person, place, and time.   Skin: Skin is warm, dry and intact. No erythema.   Psychiatric: He has a normal mood and affect.   Vitals reviewed.          Assessment:       Diagnosis Plan   1. Coronary artery disease involving native coronary artery of native heart without angina " pectoris     2. Hyperlipidemia, unspecified hyperlipidemia type         1.  Coronary artery disease: Status post CABG, asymptomatic.  Patient had stress echo 1 year ago.  No problems  2.  Hyperlipidemia: On medical therapy we will plan repeat lipid panel.     Plan:

## 2019-12-11 RX ORDER — ATORVASTATIN CALCIUM 20 MG/1
TABLET, FILM COATED ORAL
Qty: 90 TABLET | Refills: 1 | Status: SHIPPED | OUTPATIENT
Start: 2019-12-11 | End: 2020-06-23

## 2020-06-23 RX ORDER — ATORVASTATIN CALCIUM 20 MG/1
TABLET, FILM COATED ORAL
Qty: 90 TABLET | Refills: 0 | Status: SHIPPED | OUTPATIENT
Start: 2020-06-23 | End: 2020-09-25

## 2020-09-09 ENCOUNTER — TELEPHONE (OUTPATIENT)
Dept: CARDIOLOGY | Facility: CLINIC | Age: 75
End: 2020-09-09

## 2020-09-09 NOTE — TELEPHONE ENCOUNTER
[phone]  1:29pm    Pt called stating htat he would like his lab orders mailed to him for him to have done prior to Oct appt.  Please advise. [facility] RMA

## 2020-09-25 RX ORDER — ATORVASTATIN CALCIUM 20 MG/1
TABLET, FILM COATED ORAL
Qty: 90 TABLET | Refills: 0 | Status: SHIPPED | OUTPATIENT
Start: 2020-09-25 | End: 2020-09-28

## 2020-09-28 RX ORDER — ATORVASTATIN CALCIUM 20 MG/1
TABLET, FILM COATED ORAL
Qty: 90 TABLET | Refills: 0 | Status: SHIPPED | OUTPATIENT
Start: 2020-09-28 | End: 2020-10-07 | Stop reason: SDUPTHER

## 2020-10-07 ENCOUNTER — OFFICE VISIT (OUTPATIENT)
Dept: CARDIOLOGY | Facility: CLINIC | Age: 75
End: 2020-10-07

## 2020-10-07 VITALS
WEIGHT: 201 LBS | HEART RATE: 66 BPM | SYSTOLIC BLOOD PRESSURE: 144 MMHG | DIASTOLIC BLOOD PRESSURE: 80 MMHG | HEIGHT: 70 IN | BODY MASS INDEX: 28.77 KG/M2

## 2020-10-07 DIAGNOSIS — Z95.1 S/P CABG (CORONARY ARTERY BYPASS GRAFT): ICD-10-CM

## 2020-10-07 DIAGNOSIS — E83.42 HYPOMAGNESEMIA: ICD-10-CM

## 2020-10-07 DIAGNOSIS — E78.5 HYPERLIPIDEMIA, UNSPECIFIED HYPERLIPIDEMIA TYPE: ICD-10-CM

## 2020-10-07 DIAGNOSIS — I25.10 CORONARY ARTERY DISEASE INVOLVING NATIVE CORONARY ARTERY OF NATIVE HEART WITHOUT ANGINA PECTORIS: Primary | ICD-10-CM

## 2020-10-07 PROBLEM — R20.2 PARESTHESIA OF HAND: Status: ACTIVE | Noted: 2018-09-24

## 2020-10-07 PROCEDURE — 99213 OFFICE O/P EST LOW 20 MIN: CPT | Performed by: NURSE PRACTITIONER

## 2020-10-07 PROCEDURE — 93000 ELECTROCARDIOGRAM COMPLETE: CPT | Performed by: NURSE PRACTITIONER

## 2020-10-07 RX ORDER — ATORVASTATIN CALCIUM 10 MG/1
TABLET, FILM COATED ORAL
COMMUNITY
End: 2020-10-07 | Stop reason: DRUGHIGH

## 2020-10-07 RX ORDER — ATORVASTATIN CALCIUM 20 MG/1
20 TABLET, FILM COATED ORAL
Qty: 90 TABLET | Refills: 1 | Status: SHIPPED | OUTPATIENT
Start: 2020-10-07 | End: 2021-10-06

## 2020-10-07 RX ORDER — NITROGLYCERIN 0.4 MG/1
0.4 TABLET SUBLINGUAL
Qty: 25 TABLET | Refills: 5 | Status: SHIPPED | OUTPATIENT
Start: 2020-10-07 | End: 2022-10-17 | Stop reason: SDUPTHER

## 2020-10-07 NOTE — PROGRESS NOTES
Date of Office Visit: 10/07/2020  Encounter Provider: PEEWEE Mckenzie  Primary Cardiologist: Dr. Olivas  Place of Service: [facility] CARDIOLOGY  Patient Name: Ramon Reid  :1945      Subjective:     Chief Complaint:  Yearly CAD follow up    History of Present Illness:  Ramon Reid is a pleasant 75 y.o. male who I have seen once before. Outside records have been requested and reviewed by me if available.     This is a patient of Dr. Olivas with coronary artery disease status post CABG in , hyperlipidemia, hypomagnesemia.    2018 I for saw the patient after he presented to [facility] after having a syncopal episode.  His work-up was unremarkable except for he had some thickening of sinuses CT head and chronic small vessel disease.  She was apparently supposed to have a cardiac catheterization since he had not had a recent ischemic work-up so he was discharged without having a cardiac catheterization.  He was found to have low magnesium and started on magnesium supplement.  His echo from [facility] 2018 was technically difficult EF was about 45 to 50% with mild global hypokinesia, mild mitral and TR borderline left atrial enlargement.  Was noted that patient has reviewed multiple Cardiolite stress test for several years in a row due to having a bad stress test which sounds like it may have been tachycardia.  It was noted at the time that they may have exaggerated some of his symptoms while at the hospital to get more testing before he retired.  He apparently really felt fine immediately after syncopal episode and only had a little bit of dizziness prior to syncope.  He was hot outside and he was sweating that day.  He did tend to have episodes of hot flashes and sweating occurring intermittently ever since his CABG.  Blood pressures overall stable at home.  Apparently had been on antihypertensive medication many years  ago after heart surgery was taken off of it for low blood pressure with dizziness and near syncope.  We decided to proceed with stress echocardiogram to follow-up on cardiomyopathy and for an ischemic work-up.    October 2018 patient had a stress echocardiogram EF was 50% with grade 1 diastolic dysfunction, normal valvular structure and function and no significant echocardiographic evidence of myocardial ischemia, post stress EF was 63% and he had some arrhythmias with 3 beat nonsustained VT, frequent PVCs and rare PACs.      In follow-up he has done okay without further symptoms.  He is presenting today for yearly cardiac follow-up.  He has overall been doing well.  No chest pain, shortness of breath, palpitations, lower extremity edema, significant dizziness unless he stands up too quickly but no fall secondary to dizziness, no further syncope.  He does not formally exercise but does a lot of the chores around his house as he also cares for his wife who has mobility issues.  He does have some mild tingling in his feet at nighttime but has not been checked for diabetes.  He has declined going to see his PCP during COVID though I explained that he should.  His blood pressure at home tends to run 120s to 130s/70s-80s over the last 2 to 3 months.            Past Medical History:   Diagnosis Date   • CAD (coronary artery disease)    • Episode of syncope 09/20/2018    [place], [place]   • Hyperlipidemia    • Hypomagnesemia 09/20/2018   • Ischemic cardiomyopathy    • Myocardial ischemia      Past Surgical History:   Procedure Laterality Date   • CORONARY ARTERY BYPASS GRAFT  2000     Outpatient Medications Prior to Visit   Medication Sig Dispense Refill   • aspirin 81 MG tablet Take  by mouth daily.     • aspirin 81 MG oral suspension aspirin   1 each once a day     • atorvastatin (LIPITOR) 20 MG tablet TAKE 1 TABLET BY MOUTH AT NIGHT 90 tablet 0   • atorvastatin (LIPITOR) 10 MG tablet  atorvastatin 10 mg tablet   Take 1 tablet every day by oral route at bedtime.     • magnesium oxide (MAGOX) 400 (241.3 Mg) MG tablet tablet Take 400 mg by mouth Daily.     • nitroglycerin (NITROSTAT) 0.4 MG SL tablet Place 1 tablet under the tongue Every 5 (Five) Minutes As Needed for Chest Pain. If no relief in pain after 2nd dose call 911. 45 tablet 11     No facility-administered medications prior to visit.        Allergies as of 10/07/2020 - Reviewed 10/07/2020   Allergen Reaction Noted   • Thiopental  08/02/2016     Social History     Socioeconomic History   • Marital status:      Spouse name: Not on file   • Number of children: Not on file   • Years of education: Not on file   • Highest education level: Not on file   Tobacco Use   • Smoking status: Former Smoker   • Tobacco comment: caffeine use   Substance and Sexual Activity   • Alcohol use: No   • Drug use: No   • Sexual activity: Defer     History reviewed. No pertinent family history.  Review of Systems   Constitution: Negative for chills, fever and malaise/fatigue.   HENT: Negative for ear pain, hearing loss, nosebleeds and sore throat.    Eyes: Negative for double vision, pain, vision loss in left eye and vision loss in right eye.   Cardiovascular: Negative for chest pain, claudication, dyspnea on exertion, irregular heartbeat, leg swelling, near-syncope, orthopnea, palpitations, paroxysmal nocturnal dyspnea and syncope.   Respiratory: Negative for cough, shortness of breath, snoring and wheezing.    Endocrine: Negative for cold intolerance and heat intolerance.   Hematologic/Lymphatic: Negative for bleeding problem.   Skin: Negative for color change, itching, rash and unusual hair distribution.   Musculoskeletal: Negative for joint pain and joint swelling.   Gastrointestinal: Negative for abdominal pain, diarrhea, hematochezia, melena, nausea and vomiting.   Genitourinary: Negative for decreased libido, frequency, hematuria, hesitancy and  "incomplete emptying.   Neurological: Positive for paresthesias (mild in feet at night). Negative for excessive daytime sleepiness, dizziness, headaches, light-headedness, loss of balance, numbness and seizures.   Psychiatric/Behavioral: Negative for depression.          Objective:     Vitals:    10/07/20 1306   BP: 144/80   BP Location: Left arm   Patient Position: Sitting   Pulse: 66   Weight: 91.2 kg (201 lb)   Height: 177.8 cm (70\")     Body mass index is 28.84 kg/m².    PHYSICAL EXAM:  Vitals signs and nursing note reviewed.   Constitutional:       General: Not in acute distress.     Appearance: Well-developed and not in distress. Not diaphoretic.   Eyes:      Comments: Wearing glasses   HENT:      Head: Normocephalic and atraumatic.   Neck:      Vascular: No carotid bruit or JVD.   Pulmonary:      Effort: Pulmonary effort is normal. No respiratory distress.      Breath sounds: Normal breath sounds.   Cardiovascular:      Normal rate. Regular rhythm.      Comments: Pulses are normal bilateral feet.  Pulses:     Radial: 2+ bilaterally.     Dorsalis pedis: 2+ bilaterally.     Posterior tibial: 2+ bilaterally.  Abdominal:      General: Bowel sounds are normal. There is no distension.      Palpations: Abdomen is soft.      Tenderness: There is no abdominal tenderness.   Musculoskeletal: Normal range of motion.   Skin:     General: Skin is warm and dry.      Findings: No erythema.   Neurological:      Mental Status: Alert and oriented to person, place, and time.   Psychiatric:         Attention and Perception: Attention normal.         Mood and Affect: Mood normal.         Speech: Speech normal.         Behavior: Behavior normal.         Thought Content: Thought content normal.         Cognition and Memory: Cognition normal.         Judgment: Judgment normal.           ECG 12 Lead    Date/Time: 10/7/2020 1:08 PM  Performed by: Stacey Hull APRN  Authorized by: Stacey Hull APRN   Comparison: compared with " previous ECG from 10/2/2020  Similar to previous ECG  Rhythm: sinus rhythm  Rate: normal  BPM: 66  QRS axis: normal    Clinical impression: normal ECG  Comments: Indication: CAD            Most recent lab review:  2020 labs CMP normal except for low albumin 3.9 (4.0) and slightly low calcium 8.7 (8.8), normal LFTs, total cholesterol 129, triglycerides 72, HDL 47, LDL 68 VLDL 14  Assessment:       Diagnosis Plan   1. Coronary artery disease involving native coronary artery of native heart without angina pectoris     2. S/P CABG (coronary artery bypass graft)     3. Hyperlipidemia, unspecified hyperlipidemia type     4. Hypomagnesemia         Plan:     1. Coronary artery disease: Prior CABG around .  On aspirin and statin.  10/2018 stress echocardiogram without evidence of ischemia.  No anginal symptoms  2. Hyperlipidemia: On statin.  Goal LDL less than 70, HDL above 40.  His recent lipids are under control.  3. Overweight: Encourage diet and exercise vacations  4. Listed history of hypomagnesemia: Was on magnesium supplement-recommend he have magnesium level checked with his PCP.  He will discuss.  5. Syncope: No recurrence   6.  Tingling in feet at nighttime: He has palpable DP/radial pulses bilaterally and is not having any claudication symptoms.  I have asked him to follow-up with his PCP.  He does not believe he has had a hemoglobin A1c checked for diabetes.  He verbalized understanding.    He does not have a listed history of hypertension.  Blood pressure borderline elevated today but he checks frequently at home and tends to run 120s 130s/70s-80s.  He will call with any issues with high blood pressures.  He requested updated prescription of sublingual nitroglycerin as his is  and he is never had to use however.    I advised on the importance of medication compliance, good blood pressure, blood sugar and cholesterol control, as well as heart healthy diet, regular exercise and avoidance of tobacco  for cardiovascular disease risk factor modification.  No other changes from cardiac standpoint today        Follow up with Dr. Olivas in 1 year, unless otherwise needed sooner.  I advised the patient to contact our office with any questions or concerns.      It has been a pleasure to participate in this patient's care. Please feel free to contact me with any questions or concerns.     PEEWEE Mckenzie  10/07/2020             Your medication list          Accurate as of October 7, 2020  2:24 PM. If you have any questions, ask your nurse or doctor.            CHANGE how you take these medications      Instructions Last Dose Given Next Dose Due   atorvastatin 20 MG tablet  Commonly known as: LIPITOR  What changed: Another medication with the same name was removed. Continue taking this medication, and follow the directions you see here.  Changed by: PEEWEE Mckenzie      Take 1 tablet by mouth every night at bedtime.          CONTINUE taking these medications      Instructions Last Dose Given Next Dose Due   aspirin 81 MG tablet      Take  by mouth daily.       nitroglycerin 0.4 MG SL tablet  Commonly known as: Nitrostat      Place 1 tablet under the tongue Every 5 (Five) Minutes As Needed for Chest Pain. If no relief in pain after 2nd dose call 911.             Where to Get Your Medications      These medications were sent to [facility] [address] [place] [address] - [phone]  - [phone] [address] [address]    Phone: [phone]   · atorvastatin 20 MG tablet  · nitroglycerin 0.4 MG SL tablet         The above medication changes may not have been made by this provider.  Medication list was updated to reflect medications patient is currently taking including medication changes and discontinuations made by other healthcare providers or the patients themselves.     Dictated utilizing Dragon Dictation System.

## 2021-03-01 ENCOUNTER — HOSPITAL ENCOUNTER (OUTPATIENT)
Dept: VACCINE CLINIC | Facility: HOSPITAL | Age: 76
Discharge: HOME | End: 2021-03-01
Attending: INTERNAL MEDICINE

## 2021-03-22 ENCOUNTER — HOSPITAL ENCOUNTER (OUTPATIENT)
Dept: VACCINE CLINIC | Facility: HOSPITAL | Age: 76
Discharge: HOME | End: 2021-03-22
Attending: INTERNAL MEDICINE

## 2021-09-14 ENCOUNTER — TELEPHONE (OUTPATIENT)
Dept: CARDIOLOGY | Facility: CLINIC | Age: 76
End: 2021-09-14

## 2021-09-14 DIAGNOSIS — E78.5 HYPERLIPIDEMIA, UNSPECIFIED HYPERLIPIDEMIA TYPE: Primary | ICD-10-CM

## 2021-10-06 RX ORDER — ATORVASTATIN CALCIUM 20 MG/1
TABLET, FILM COATED ORAL
Qty: 90 TABLET | Refills: 0 | Status: SHIPPED | OUTPATIENT
Start: 2021-10-06 | End: 2021-12-27

## 2021-10-11 ENCOUNTER — OFFICE VISIT (OUTPATIENT)
Dept: CARDIOLOGY | Facility: CLINIC | Age: 76
End: 2021-10-11

## 2021-10-11 VITALS
HEIGHT: 70 IN | WEIGHT: 202.4 LBS | BODY MASS INDEX: 28.98 KG/M2 | HEART RATE: 71 BPM | SYSTOLIC BLOOD PRESSURE: 120 MMHG | OXYGEN SATURATION: 97 % | DIASTOLIC BLOOD PRESSURE: 82 MMHG

## 2021-10-11 DIAGNOSIS — I25.10 CORONARY ARTERY DISEASE INVOLVING NATIVE CORONARY ARTERY OF NATIVE HEART WITHOUT ANGINA PECTORIS: Primary | ICD-10-CM

## 2021-10-11 DIAGNOSIS — E78.5 HYPERLIPIDEMIA, UNSPECIFIED HYPERLIPIDEMIA TYPE: ICD-10-CM

## 2021-10-11 PROCEDURE — 99214 OFFICE O/P EST MOD 30 MIN: CPT | Performed by: INTERNAL MEDICINE

## 2021-10-11 PROCEDURE — 93000 ELECTROCARDIOGRAM COMPLETE: CPT | Performed by: INTERNAL MEDICINE

## 2021-10-11 RX ORDER — ATORVASTATIN CALCIUM 20 MG/1
TABLET, FILM COATED ORAL
Qty: 90 TABLET | Refills: 0 | OUTPATIENT
Start: 2021-10-11

## 2021-10-11 NOTE — PROGRESS NOTES
OFFICE VISIT      Date of Office Visit: 10/11/2021    Patient Name: Ramon Reid  : 1945    Encounter Provider: Adin Olivas MD  Referring Provider: No ref. provider found  Primary Care Provider: Alayna Oneill MD  Place of Service: [facility] CARDIOLOGY        Chief Complaint   Patient presents with   • Coronary Artery Disease   • Follow-up     History of Present Illness  The patient is a 76-year-old white male with coronary artery disease.  He underwent bypass surgery back in .  He reports no angina pectoris at all.  Last echocardiogram at [facility] showed his ejection fraction to be approximately 45 to 50%.  The patient has had a Cardiolite stress test without abnormality.  He does not report any angina.  I reviewed his most recent lipid panel it is excellent.  He is at target remaining on statin therapy.      Past Medical History:   Diagnosis Date   • CAD (coronary artery disease)    • Episode of syncope 2018    [place], [place]   • Hyperlipidemia    • Hypomagnesemia 2018   • Ischemic cardiomyopathy    • Myocardial ischemia          Past Surgical History:   Procedure Laterality Date   • CORONARY ARTERY BYPASS GRAFT             Current Outpatient Medications:   •  aspirin 81 MG tablet, Take  by mouth daily., Disp: , Rfl:   •  atorvastatin (LIPITOR) 20 MG tablet, TAKE 1 TABLET BY MOUTH EVERY DAY AT BEDTIME, Disp: 90 tablet, Rfl: 0  •  nitroglycerin (Nitrostat) 0.4 MG SL tablet, Place 1 tablet under the tongue Every 5 (Five) Minutes As Needed for Chest Pain. If no relief in pain after 2nd dose call 911., Disp: 25 tablet, Rfl: 5      Social History     Socioeconomic History   • Marital status:    Tobacco Use   • Smoking status: Former Smoker   • Tobacco comment: caffeine use   Substance and Sexual Activity   • Alcohol use: No   • Drug use: No   • Sexual activity: Defer         Review  "of Systems   Constitutional: Negative.   HENT: Negative.    Eyes: Negative.    Cardiovascular: Negative.    Respiratory: Negative.    Endocrine: Negative.    Skin: Negative.    Musculoskeletal: Negative.    Gastrointestinal: Negative.    Neurological: Negative.    Psychiatric/Behavioral: Negative.        Procedures      ECG 12 Lead    Date/Time: 10/11/2021 4:38 PM  Performed by: Adin Olivas MD  Authorized by: Adin Olivas MD   Comparison: compared with previous ECG from 10/7/2020  Similar to previous ECG  Rhythm: sinus rhythm  Rate: normal  Conduction: conduction normal  ST Segments: ST segments normal  T Waves: T waves normal  QRS axis: normal                  Objective:    /82 (BP Location: Left arm, Patient Position: Sitting)   Pulse 71   Ht 177.8 cm (70\")   Wt 91.8 kg (202 lb 6.4 oz)   SpO2 97%   BMI 29.04 kg/m²         Vitals reviewed.   Constitutional:       Appearance: Well-developed.   Neck:      Thyroid: No thyromegaly.      Vascular: No carotid bruit or JVD. JVD normal.   Pulmonary:      Effort: Pulmonary effort is normal.      Breath sounds: Normal breath sounds.   Cardiovascular:      Normal rate. Regular rhythm. Normal S1. Normal S2.      Murmurs: There is no murmur.      No gallop.   Pulses:     Intact distal pulses.   Edema:     Peripheral edema absent.   Skin:     General: Skin is warm and dry.      Findings: No erythema.   Neurological:      Mental Status: Alert and oriented to person, place, and time.             Assessment:       Diagnosis Plan   1. Coronary artery disease involving native coronary artery of native heart without angina pectoris     2. Hyperlipidemia, unspecified hyperlipidemia type         1.  Coronary artery disease: Status post CABG.  No angina pectoris.  Borderline LV function  2.  Hyperlipidemia: On statin therapy.  Labs at target     Plan:         "

## 2021-12-27 RX ORDER — ATORVASTATIN CALCIUM 20 MG/1
TABLET, FILM COATED ORAL
Qty: 90 TABLET | Refills: 3 | Status: SHIPPED | OUTPATIENT
Start: 2021-12-27 | End: 2022-10-17 | Stop reason: SDUPTHER

## 2021-12-27 NOTE — TELEPHONE ENCOUNTER
Please advise filling Atorvastatin    LOV   -   10/11/21  Next   -   10/17/22  Last labs  -   9/21/21 Lipid  SCANNED labs    Kera MARINELLI

## 2022-09-22 ENCOUNTER — TELEPHONE (OUTPATIENT)
Dept: CARDIOLOGY | Facility: CLINIC | Age: 77
End: 2022-09-22

## 2022-09-22 NOTE — TELEPHONE ENCOUNTER
Ramon ( prev RL pt) called and said before every appt he had blood drawn and he needs paperwork mailed to him because he does not live in [place].    Can you give him a call please and see what he needs and if theres anything I can do please let me know.     Thanks   Renetta

## 2022-09-26 DIAGNOSIS — E78.00 HYPERCHOLESTEROLEMIA: Primary | ICD-10-CM

## 2022-10-17 ENCOUNTER — OFFICE VISIT (OUTPATIENT)
Dept: CARDIOLOGY | Facility: CLINIC | Age: 77
End: 2022-10-17

## 2022-10-17 VITALS
HEART RATE: 70 BPM | BODY MASS INDEX: 29.63 KG/M2 | HEIGHT: 70 IN | SYSTOLIC BLOOD PRESSURE: 134 MMHG | DIASTOLIC BLOOD PRESSURE: 80 MMHG | WEIGHT: 207 LBS | OXYGEN SATURATION: 98 %

## 2022-10-17 DIAGNOSIS — E78.5 HYPERLIPIDEMIA, UNSPECIFIED HYPERLIPIDEMIA TYPE: ICD-10-CM

## 2022-10-17 DIAGNOSIS — I25.10 CORONARY ARTERY DISEASE INVOLVING NATIVE CORONARY ARTERY OF NATIVE HEART WITHOUT ANGINA PECTORIS: Primary | ICD-10-CM

## 2022-10-17 DIAGNOSIS — Z95.1 S/P CABG (CORONARY ARTERY BYPASS GRAFT): ICD-10-CM

## 2022-10-17 PROCEDURE — 93000 ELECTROCARDIOGRAM COMPLETE: CPT | Performed by: NURSE PRACTITIONER

## 2022-10-17 PROCEDURE — 99214 OFFICE O/P EST MOD 30 MIN: CPT | Performed by: NURSE PRACTITIONER

## 2022-10-17 RX ORDER — ATORVASTATIN CALCIUM 20 MG/1
20 TABLET, FILM COATED ORAL
Qty: 90 TABLET | Refills: 3 | Status: SHIPPED | OUTPATIENT
Start: 2022-10-17

## 2022-10-17 RX ORDER — NITROGLYCERIN 0.4 MG/1
0.4 TABLET SUBLINGUAL
Qty: 25 TABLET | Refills: 5 | Status: SHIPPED | OUTPATIENT
Start: 2022-10-17

## 2022-10-17 NOTE — PROGRESS NOTES
Date of Office Visit: 10/17/2022  Encounter Provider: PEEWEE Engel  Place of Service: [facility] CARDIOLOGY  Patient Name: Ramon Reid  :1945    Chief Complaint   Patient presents with   • Coronary Artery Disease   • Follow-up   :     HPI: Ramon Reid is a 77 y.o. male who is a patient of Dr. Olivas.  Is new to me today and presents for a 1 year office follow-up.  He has a history of coronary artery disease and hyperlipidemia.  He underwent bypass surgery in .  Patient had a normal stress echo with no significant evidence of myocardial ischemia 10/10/2018.  Pre-EF 50%/post-EF 63%.  There was grade 1 diastolic dysfunction and no valvular disease.    Today patient presents with no complaints of chest pain, shortness of breath, dizziness or edema.  Blood pressure is well controlled on no medications.  EKG stable from previous year.  Lipid panel in target range on statin therapy.  Patient is not diabetic.  His only complaint is that his feet will feel tingly sometimes at night when he lies down.  Resolves when he moves them around.  Also notes that he will sometimes wake himself up because he stops breathing at night.  When asked if he snores, he states that his wife says he always snores.  He has never had a sleep study.  We discussed how the heart and lungs depend on each other and the importance of addressing sleep apnea if he is found to have it.  I have offered to put a referral for our sleep medicine team who practices and [facility] office as well.  Patient will check with his PCP for a referral and then call me back if he needs one with our pulmonary group.  Patient notes that he not been as active as he used to be because his wife had knee replacements a few years ago and he is afraid to leave her alone.    Previous testing and notes have been reviewed by me.   Past Medical History:   Diagnosis Date   • CAD (coronary artery disease)    • Episode of  "syncope 09/20/2018    [place], [place]   • Hyperlipidemia    • Hypomagnesemia 09/20/2018   • Ischemic cardiomyopathy    • Myocardial ischemia        Past Surgical History:   Procedure Laterality Date   • CORONARY ARTERY BYPASS GRAFT  2000       Social History     Socioeconomic History   • Marital status:    Tobacco Use   • Smoking status: Former   • Tobacco comments:     caffeine use   Substance and Sexual Activity   • Alcohol use: No   • Drug use: No   • Sexual activity: Defer       History reviewed. No pertinent family history.    Review of Systems   Constitutional: Negative.   HENT: Negative.    Eyes: Negative.    Cardiovascular: Negative.    Respiratory: Negative.    Endocrine: Negative.    Hematologic/Lymphatic: Negative.    Skin: Negative.    Musculoskeletal: Negative.    Gastrointestinal: Negative.    Genitourinary: Negative.    Neurological: Negative.    Psychiatric/Behavioral: Negative.    Allergic/Immunologic: Negative.        Allergies   Allergen Reactions   • Thiopental          Current Outpatient Medications:   •  aspirin 81 MG tablet, Take  by mouth daily., Disp: , Rfl:   •  atorvastatin (LIPITOR) 20 MG tablet, Take 1 tablet by mouth every night at bedtime., Disp: 90 tablet, Rfl: 3  •  nitroglycerin (Nitrostat) 0.4 MG SL tablet, Place 1 tablet under the tongue Every 5 (Five) Minutes As Needed for Chest Pain. If no relief in pain after 2nd dose call 911., Disp: 25 tablet, Rfl: 5      Objective:     Vitals:    10/17/22 1447   BP: 134/80   BP Location: Left arm   Patient Position: Sitting   Pulse: 70   SpO2: 98%   Weight: 93.9 kg (207 lb)   Height: 177.8 cm (70\")     Body mass index is 29.7 kg/m².    PHYSICAL EXAM:    Constitutional:       Appearance: Healthy appearance. Not in distress.   Neck:      Vascular: No JVR. JVD normal.   Pulmonary:      Effort: Pulmonary effort is normal.      Breath sounds: Normal breath sounds. No wheezing. No rhonchi. No rales.   Chest:     "  Chest wall: Not tender to palpatation.   Cardiovascular:      PMI at left midclavicular line. Normal rate. Regular rhythm. Normal S1. Normal S2.      Murmurs: There is no murmur.      No gallop. No click. No rub.   Pulses:     Intact distal pulses.   Edema:     Peripheral edema absent.   Abdominal:      General: Bowel sounds are normal.      Palpations: Abdomen is soft.      Tenderness: There is no abdominal tenderness.   Musculoskeletal: Normal range of motion.         General: No tenderness. Skin:     General: Skin is warm and dry.   Neurological:      General: No focal deficit present.      Mental Status: Alert and oriented to person, place and time.           ECG 12 Lead    Date/Time: 10/17/2022 3:34 PM  Performed by: Quita Harris APRN  Authorized by: Quita Harris APRN   Comparison: compared with previous ECG from 10/11/2021  Similar to previous ECG  Rhythm: sinus rhythm  Rate: normal  BPM: 70  Conduction: conduction normal  ST Segments: ST segments normal  T Waves: T waves normal    Clinical impression: normal ECG              Assessment:       Diagnosis Plan   1. Coronary artery disease involving native coronary artery of native heart without angina pectoris        2. S/P CABG (coronary artery bypass graft)        3. Hyperlipidemia, unspecified hyperlipidemia type          Orders Placed This Encounter   Procedures   • ECG 12 Lead     This order was created via procedure documentation     Order Specific Question:   Release to patient     Answer:   Routine Release          Plan:       1.  Coronary artery disease: Status post CABG in 2000.  Stress echo in 2018 normal.  No angina.  Stable EKG.  Patient agrees to increase his exercise activity as he notes he does have a stationary bike and treadmill in the house.   2.  Possible sleep apnea: Recommend outpatient sleep study.  Patient will check with his PCP for referral for sleep medicine team closer to home.  If he is unable to find anyone closer to  home then he will call me back and I will place referral for our sleep medicine doctors who have an office in [facility].    Mr. Reid follow-up with Dr. Cantrell in 1 year.  He will call sooner for any questions or concerns.         Your medication list          Accurate as of October 17, 2022  3:35 PM. If you have any questions, ask your nurse or doctor.            CONTINUE taking these medications      Instructions Last Dose Given Next Dose Due   aspirin 81 MG tablet      Take  by mouth daily.       atorvastatin 20 MG tablet  Commonly known as: LIPITOR      Take 1 tablet by mouth every night at bedtime.       nitroglycerin 0.4 MG SL tablet  Commonly known as: Nitrostat      Place 1 tablet under the tongue Every 5 (Five) Minutes As Needed for Chest Pain. If no relief in pain after 2nd dose call 911.             Where to Get Your Medications      These medications were sent to [facility] [address] [place], [place] [address] [facility] [phone]  - [phone] [address] [address]    Phone: [phone]   · atorvastatin 20 MG tablet  · nitroglycerin 0.4 MG SL tablet           As always, it has been a pleasure to participate in your patient's care.      Sincerely,       PEEWEE Seaman

## 2023-09-19 ENCOUNTER — TELEPHONE (OUTPATIENT)
Dept: CARDIOLOGY | Facility: CLINIC | Age: 78
End: 2023-09-19
Payer: MEDICARE

## 2023-09-19 DIAGNOSIS — I25.10 CORONARY ARTERY DISEASE INVOLVING NATIVE CORONARY ARTERY OF NATIVE HEART WITHOUT ANGINA PECTORIS: Primary | ICD-10-CM

## 2023-09-19 NOTE — TELEPHONE ENCOUNTER
Caller: Ramon Reid    Relationship to patient: Self    Best call back number: [phone]    Patient is needing: PATIENT REQUESTING LAB ORDERS BE MAILED TO HIS HOUSE ON FILE SO HE CAN GET HIS LABS COMPLETED LOCALLY.

## 2023-09-19 NOTE — TELEPHONE ENCOUNTER
Printed orders. Called and spoke with pt. He prefers them to be sent in the mail. I will send them in the mail today.

## 2023-10-19 ENCOUNTER — OFFICE VISIT (OUTPATIENT)
Age: 78
End: 2023-10-19
Payer: MEDICARE

## 2023-10-19 VITALS
HEART RATE: 76 BPM | WEIGHT: 199.6 LBS | SYSTOLIC BLOOD PRESSURE: 126 MMHG | BODY MASS INDEX: 28.58 KG/M2 | DIASTOLIC BLOOD PRESSURE: 72 MMHG | HEIGHT: 70 IN

## 2023-10-19 DIAGNOSIS — E78.5 HYPERLIPIDEMIA, UNSPECIFIED HYPERLIPIDEMIA TYPE: ICD-10-CM

## 2023-10-19 DIAGNOSIS — I25.10 CORONARY ARTERY DISEASE INVOLVING NATIVE CORONARY ARTERY OF NATIVE HEART WITHOUT ANGINA PECTORIS: Primary | ICD-10-CM

## 2023-10-19 PROCEDURE — 93000 ELECTROCARDIOGRAM COMPLETE: CPT | Performed by: INTERNAL MEDICINE

## 2023-10-19 PROCEDURE — 99214 OFFICE O/P EST MOD 30 MIN: CPT | Performed by: INTERNAL MEDICINE

## 2023-10-19 PROCEDURE — 1159F MED LIST DOCD IN RCRD: CPT | Performed by: INTERNAL MEDICINE

## 2023-10-19 PROCEDURE — 1160F RVW MEDS BY RX/DR IN RCRD: CPT | Performed by: INTERNAL MEDICINE

## 2023-10-19 NOTE — PROGRESS NOTES
[facility] Follow Up Office Note     Encounter Date:10/19/23  Patient:Ramon Reid  :1945  MRN:[id number]      Chief Complaint:   Chief Complaint   Patient presents with    Coronary Artery Disease     1 year f/u       History of Presenting Illness:      Mr. Reid is a 78 y.o. gentleman with past medical history notable for coronary disease status post bypass surgery and mixed hyperlipidemia who presents to our office for scheduled follow-up.  He was previously following with one of our partners who recently retired and is transitioning his care over to myself.  In general he is actually doing fantastic.  He was seen about a year ago at that time no changes were needed and is following up with his for his routine visit.  No new issues have arisen.      Review of Systems:  Review of Systems   Constitutional: Negative.   HENT: Negative.     Eyes: Negative.    Cardiovascular: Negative.    Respiratory: Negative.     Endocrine: Negative.    Hematologic/Lymphatic: Negative.    Skin: Negative.    Musculoskeletal: Negative.    Gastrointestinal: Negative.    Genitourinary: Negative.    Neurological: Negative.    Psychiatric/Behavioral: Negative.     Allergic/Immunologic: Negative.        Current Outpatient Medications on File Prior to Visit   Medication Sig Dispense Refill    aspirin 81 MG tablet Take  by mouth daily.      atorvastatin (LIPITOR) 20 MG tablet Take 1 tablet by mouth every night at bedtime. 90 tablet 3    nitroglycerin (Nitrostat) 0.4 MG SL tablet Place 1 tablet under the tongue Every 5 (Five) Minutes As Needed for Chest Pain. If no relief in pain after 2nd dose call 911. 25 tablet 5     No current facility-administered medications on file prior to visit.       Allergies   Allergen Reactions    Thiopental        Past Medical History:   Diagnosis Date    CAD (coronary artery disease)     Episode of syncope 2018    [place], [place]    Hyperlipidemia      "Hypomagnesemia 09/20/2018    Ischemic cardiomyopathy     Myocardial ischemia        Past Surgical History:   Procedure Laterality Date    CORONARY ARTERY BYPASS GRAFT  2000       Social History     Socioeconomic History    Marital status:    Tobacco Use    Smoking status: Former    Tobacco comments:     caffeine use   Substance and Sexual Activity    Alcohol use: No    Drug use: No    Sexual activity: Defer       History reviewed. No pertinent family history.    The following portions of the patient's history were reviewed and updated as appropriate: allergies, current medications, past family history, past medical history, past social history, past surgical history and problem list.       Objective:       Vitals:    10/19/23 1421   BP: 126/72   BP Location: Left arm   Patient Position: Sitting   Pulse: 76   Weight: 90.5 kg (199 lb 9.6 oz)   Height: 177.8 cm (70\")     Body mass index is 28.64 kg/m².     Physical Exam:  Constitutional: Well appearing, well developed, no acute distress   HENT: Oropharynx clear and membrane moist  Eyes: Normal conjunctiva, no sclera icterus.  Neck: Supple, no carotid bruit bilaterally.  Cardiovascular: Regular rate and rhythm, No Murmur, No bilateral lower extremity edema.  Pulmonary: Normal respiratory effort, normal lung sounds, no wheezing.  Neurological: Alert and orient x 3.   Skin: Warm, dry, no ecchymosis, no rash.  Psych: Appropriate mood and affect. Normal judgment and insight.       No results found for: \"NA\", \"K\", \"CL\", \"CO2\", \"BUN\", \"CREATININE\", \"EGFRIFNONA\", \"EGFRIFAFRI\", \"GLUCOSE\", \"CALCIUM\", \"PROTENTOTREF\", \"ALBUMIN\", \"BILITOT\", \"AST\", \"ALT\"  No results found for: \"WBC\", \"HGB\", \"HCT\", \"MCV\", \"PLT\"  No results found for: \"CHOL\", \"TRIG\", \"HDL\", \"LDL\"  No results found for: \"PROBNP\", \"BNP\"  No results found for: \"CKTOTAL\", \"CKMB\", \"CKMBINDEX\", \"TROPONINI\", \"TROPONINT\"  No results found for: \"TSH\"        ECG 12 Lead    Date/Time: 10/19/2023 3:01 PM  Performed by: " Casey Cantrell MD    Authorized by: Casey Cantrell MD  Comparison: compared with previous ECG from 10/17/2022  Similar to previous ECG  Rhythm: sinus rhythm        Stress Echocardiogram 10/10/2018:  Calculated EF = 50%.  Left ventricular systolic function is normal.  Left ventricular diastolic dysfunction (grade I) consistent with impaired relaxation.  Normal valvular structure and function  Stress echo  Normal stress echo with no significant echocardiographic evidence for myocardial ischemia.  Post EF=63%.  Arrhythmias during recovery: rare PACs, frequent PVCs, non-sustained VT 3 beats noted during recovery. , couplets, bigeminy. Arrhythmias were significant.       Assessment:          Diagnosis Plan   1. Coronary artery disease involving native coronary artery of native heart without angina pectoris  ECG 12 Lead      2. Hyperlipidemia, unspecified hyperlipidemia type  ECG 12 Lead             Plan:       Mr. Reid is a 78 y.o. gentleman with past medical history notable for coronary disease status post bypass surgery and mixed hyperlipidemia who presents to our office for scheduled follow-up.  He is doing great.  We continue his aspirin and statin.  He is not on a beta-blocker but given that he is now 23 years out from his heart surgery and doing well with good blood pressure and heart rate control would hold off on titrating on currently.  His recent lab work obtained locally demonstrates good total cholesterol and LDL with normal AST and ALT.  No changes are needed at this time we will plan on seeing back in 1 year.    Coronary artery disease without angina:  Continue aspirin  Continue statin  Patient not on beta-blocker but good blood pressure and heart rate control we will hold off on titration currently    Mixed hyperlipidemia:  Continue statin therapy  Lipid panel 9/2023 demonstrates good control of total cholesterol and LDL  CMP 9/2023 demonstrates normal ALT and AST    Advance Care Planning   ACP  discussion was held with the patient during this visit. Patient has an advance directive (not in EMR), copy requested.       Follow Up:  1 Year      Casey Cantrell MD  [facility]  10/19/23  15:02 EDT

## 2023-11-02 RX ORDER — ATORVASTATIN CALCIUM 20 MG/1
20 TABLET, FILM COATED ORAL
Qty: 90 TABLET | Refills: 3 | Status: SHIPPED | OUTPATIENT
Start: 2023-11-02

## 2024-10-22 ENCOUNTER — OFFICE VISIT (OUTPATIENT)
Dept: CARDIOLOGY | Facility: CLINIC | Age: 79
End: 2024-10-22
Payer: MEDICARE

## 2024-10-22 VITALS
OXYGEN SATURATION: 97 % | HEART RATE: 75 BPM | WEIGHT: 198 LBS | HEIGHT: 70 IN | SYSTOLIC BLOOD PRESSURE: 138 MMHG | DIASTOLIC BLOOD PRESSURE: 60 MMHG | BODY MASS INDEX: 28.35 KG/M2

## 2024-10-22 DIAGNOSIS — I25.10 CORONARY ARTERY DISEASE INVOLVING NATIVE CORONARY ARTERY OF NATIVE HEART WITHOUT ANGINA PECTORIS: Primary | ICD-10-CM

## 2024-10-22 PROCEDURE — 93000 ELECTROCARDIOGRAM COMPLETE: CPT | Performed by: NURSE PRACTITIONER

## 2024-10-22 PROCEDURE — 1160F RVW MEDS BY RX/DR IN RCRD: CPT | Performed by: NURSE PRACTITIONER

## 2024-10-22 PROCEDURE — 1159F MED LIST DOCD IN RCRD: CPT | Performed by: NURSE PRACTITIONER

## 2024-10-22 PROCEDURE — 99214 OFFICE O/P EST MOD 30 MIN: CPT | Performed by: NURSE PRACTITIONER

## 2024-10-22 NOTE — PROGRESS NOTES
Date of Office Visit: 10/22/2024  Encounter Provider: PEEWEE Moore  Place of Service: [facility] CARDIOLOGY  Patient Name: Ramon Reid  :1945    Chief complaint: Coronary artery disease    HPI: Ramon Reid is a 79 y.o. male who is a patient of Dr. Cantrell and is new to me today.  He has a history of coronary artery disease with bypass surgery in the past hyperlipidemia.  He had a stress echocardiogram in 2018 that was unremarkable.  He did have some PVCs and PACs at that time.  He also has hyperlipidemia has been maintained on statin therapy.    He denies any chest pain, pressure or tightness.  He brings in recent labs from his primary care office lipid panel is at goal with an HDL of 51 and an LDL of 77.  Blood pressures have been stable at home he takes care of his wife full-time he has a treadmill and an exercise bike which she does not really use.  He stopped smoking and vaping.  Previous testing and notes have been reviewed by me.   Past Medical History:   Diagnosis Date    CAD (coronary artery disease)     Episode of syncope 2018    [place], [place]    Hyperlipidemia     Hypomagnesemia 2018    Ischemic cardiomyopathy     Myocardial ischemia        Past Surgical History:   Procedure Laterality Date    CORONARY ARTERY BYPASS GRAFT         Social History     Socioeconomic History    Marital status:    Tobacco Use    Smoking status: Former    Tobacco comments:     caffeine use   Vaping Use    Vaping status: Never Used   Substance and Sexual Activity    Alcohol use: No    Drug use: No    Sexual activity: Defer       History reviewed. No pertinent family history.    Review of Systems   Constitutional: Negative for diaphoresis and malaise/fatigue.   Cardiovascular:  Negative for chest pain, claudication, dyspnea on exertion, irregular heartbeat, leg swelling, near-syncope, orthopnea, palpitations, paroxysmal  "nocturnal dyspnea and syncope.   Respiratory:  Negative for cough, shortness of breath and sleep disturbances due to breathing.    Musculoskeletal:  Negative for falls.   Neurological:  Negative for dizziness and weakness.   Psychiatric/Behavioral:  Negative for altered mental status and substance abuse.        Allergies   Allergen Reactions    Thiopental          Current Outpatient Medications:     aspirin 81 MG tablet, Take  by mouth daily., Disp: , Rfl:     atorvastatin (LIPITOR) 20 MG tablet, TAKE ONE TABLET BY MOUTH EVERY NIGHT AT BEDTIME, Disp: 90 tablet, Rfl: 3    nitroglycerin (Nitrostat) 0.4 MG SL tablet, Place 1 tablet under the tongue Every 5 (Five) Minutes As Needed for Chest Pain. If no relief in pain after 2nd dose call 911., Disp: 25 tablet, Rfl: 5      Objective:     Vitals:    10/22/24 1409   BP: 138/60   BP Location: Left arm   Patient Position: Sitting   Pulse: 75   SpO2: 97%   Weight: 89.8 kg (198 lb)   Height: 177.8 cm (70\")     Body mass index is 28.41 kg/m².    PHYSICAL EXAM:    Constitutional:       General: Not in acute distress.     Appearance: Normal appearance. Well-developed.   Eyes:      Pupils: Pupils are equal, round, and reactive to light.   HENT:      Head: Normocephalic.   Neck:      Vascular: No carotid bruit or JVD.   Pulmonary:      Effort: Pulmonary effort is normal. No tachypnea.      Breath sounds: Normal breath sounds. No wheezing. No rales.   Cardiovascular:      Normal rate. Regular rhythm.      No gallop.    Pulses:     Intact distal pulses.   Edema:     Peripheral edema absent.   Abdominal:      General: Bowel sounds are normal.      Palpations: Abdomen is soft.      Tenderness: There is no abdominal tenderness.   Musculoskeletal: Normal range of motion.      Cervical back: Normal range of motion and neck supple. No edema. Skin:     General: Skin is warm and dry.   Neurological:      Mental Status: Alert and oriented to person, place, and time.           ECG 12 " Lead    Date/Time: 10/22/2024 2:48 PM  Performed by: Terri Low APRN    Authorized by: Terri Low APRN  Comparison: compared with previous ECG from 10/19/2023  Similar to previous ECG  Rhythm: sinus rhythm  Ectopy: multifocal PVCs and infrequent PVCs  Rate: normal  QRS axis: normal  Other findings: non-specific ST-T wave changes    Clinical impression: abnormal EKG            Assessment:   1.  Coronary artery disease on aspirin and statin-no angina symptoms    2.  Dyslipidemia lipids at goal on current statin dose    3.  Ex-smoker continues to abstain from tobacco    4.  PVCs unchanged from previous, asymptomatic     Plan:       Follow-up in 1 year encouraged exercise         Your medication list            Accurate as of October 22, 2024  2:32 PM. If you have any questions, ask your nurse or doctor.                CONTINUE taking these medications        Instructions Last Dose Given Next Dose Due   aspirin 81 MG tablet      Take  by mouth daily.       atorvastatin 20 MG tablet  Commonly known as: LIPITOR      TAKE ONE TABLET BY MOUTH EVERY NIGHT AT BEDTIME       nitroglycerin 0.4 MG SL tablet  Commonly known as: Nitrostat      Place 1 tablet under the tongue Every 5 (Five) Minutes As Needed for Chest Pain. If no relief in pain after 2nd dose call 911.                  As always, it has been a pleasure to participate in your patient's care.      Sincerely,     Terri VIDES